# Patient Record
Sex: FEMALE
[De-identification: names, ages, dates, MRNs, and addresses within clinical notes are randomized per-mention and may not be internally consistent; named-entity substitution may affect disease eponyms.]

---

## 2020-09-21 ENCOUNTER — APPOINTMENT (OUTPATIENT)
Dept: ORTHOPEDIC SURGERY | Facility: CLINIC | Age: 85
End: 2020-09-21
Payer: MEDICARE

## 2020-09-21 VITALS
WEIGHT: 156 LBS | BODY MASS INDEX: 28.71 KG/M2 | HEIGHT: 62 IN | HEART RATE: 88 BPM | SYSTOLIC BLOOD PRESSURE: 208 MMHG | DIASTOLIC BLOOD PRESSURE: 97 MMHG | TEMPERATURE: 98.2 F

## 2020-09-21 DIAGNOSIS — M16.12 UNILATERAL PRIMARY OSTEOARTHRITIS, LEFT HIP: ICD-10-CM

## 2020-09-21 DIAGNOSIS — Z86.79 PERSONAL HISTORY OF OTHER DISEASES OF THE CIRCULATORY SYSTEM: ICD-10-CM

## 2020-09-21 DIAGNOSIS — M17.12 UNILATERAL PRIMARY OSTEOARTHRITIS, LEFT KNEE: ICD-10-CM

## 2020-09-21 DIAGNOSIS — Z82.61 FAMILY HISTORY OF ARTHRITIS: ICD-10-CM

## 2020-09-21 PROBLEM — Z00.00 ENCOUNTER FOR PREVENTIVE HEALTH EXAMINATION: Status: ACTIVE | Noted: 2020-09-21

## 2020-09-21 PROCEDURE — 73502 X-RAY EXAM HIP UNI 2-3 VIEWS: CPT | Mod: LT

## 2020-09-21 PROCEDURE — 99203 OFFICE O/P NEW LOW 30 MIN: CPT

## 2020-09-21 PROCEDURE — 73562 X-RAY EXAM OF KNEE 3: CPT | Mod: LT

## 2020-09-21 RX ORDER — GLIMEPIRIDE 4 MG/1
TABLET ORAL
Refills: 0 | Status: ACTIVE | COMMUNITY

## 2020-09-21 RX ORDER — AMLODIPINE BESYLATE 5 MG/1
TABLET ORAL
Refills: 0 | Status: ACTIVE | COMMUNITY

## 2020-09-21 NOTE — PHYSICAL EXAM
[LE] : Sensory: Intact in bilateral lower extremities [DP] : dorsalis pedis 2+ and symmetric bilaterally [Normal RLE] : Right Lower Extremity: No scars, rashes, lesions, ulcers, skin intact [Normal] : Oriented to person, place, and time, insight and judgement were intact and the affect was normal [de-identified] : mild varus deformity left knee\par knee ROM 0-90 degrees bilaterally\par + YOVANI left hip\par no joint line tenderness bilateral knees [de-identified] : + large erythematous abrasion to left shin [de-identified] : Xray left knee 3 view: Osteoarthritis of the left knee, most pronounced in the medial compartment which is bone on bone. + Varus deformity. Negative for fracture.\par Xray left hip 2 view: Osteoarthritis of the left hip. Negative for fracture or dislocation

## 2020-09-21 NOTE — REASON FOR VISIT
[Initial Visit] : an initial visit for [Hip Pain] : hip pain [Knee Pain] : knee pain [FreeTextEntry2] : Left hip and knee pain

## 2020-09-21 NOTE — REVIEW OF SYSTEMS
[Arthralgia] : arthralgia [Joint Pain] : joint pain [Joint Stiffness] : joint stiffness [Joint Swelling] : joint swelling [Feeling Weak] : feeling weak

## 2020-09-21 NOTE — DISCUSSION/SUMMARY
[de-identified] : 86 y/o female with left knee and hip pain, with significant arthritis on X-ray. Patient reluctant to try injections due to allergies. Recommend PT for the left hip and knee. She will follow up on an as needed basis.

## 2020-09-21 NOTE — HISTORY OF PRESENT ILLNESS
[Worsening] : worsening [___ mths] : [unfilled] month(s) ago [Hip Movement] : worsened by hip movement [Walking] : worsened by walking [Knee Flexion] : worsened with knee flexion [Knee Extension] : worsened with knee extension [8] : a current pain level of 8/10 [9] : an average pain level of 9/10 [6] : a minimum pain level of 6/10 [10] : a maximum pain level of 10/10 [de-identified] : 86 y/o female presenting for initial visit c/o left hip and knee pain for years, but worsening since March. 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 Her doctor's office has been closed since the pandemic leaving her with no medical doctor. She continued to have left hip and knee pain significantly limiting her activity since that time. She has been ambulating with a cane for assistance. She denies any leg buckling. She denies any numbness or tingling. She has not tried injections or PT but is reluctant to have an injection due to her allergies and reaction to Tylenol.